# Patient Record
Sex: FEMALE | Race: BLACK OR AFRICAN AMERICAN | NOT HISPANIC OR LATINO
[De-identification: names, ages, dates, MRNs, and addresses within clinical notes are randomized per-mention and may not be internally consistent; named-entity substitution may affect disease eponyms.]

---

## 2017-01-24 ENCOUNTER — RESULT CHARGE (OUTPATIENT)
Age: 52
End: 2017-01-24

## 2017-01-25 ENCOUNTER — APPOINTMENT (OUTPATIENT)
Dept: OTHER | Facility: CLINIC | Age: 52
End: 2017-01-25

## 2017-01-25 VITALS
WEIGHT: 141 LBS | OXYGEN SATURATION: 100 % | DIASTOLIC BLOOD PRESSURE: 81 MMHG | HEIGHT: 64 IN | BODY MASS INDEX: 24.07 KG/M2 | HEART RATE: 70 BPM | SYSTOLIC BLOOD PRESSURE: 138 MMHG

## 2017-01-26 LAB
ALBUMIN SERPL ELPH-MCNC: 4.3 G/DL
ALP BLD-CCNC: 73 U/L
ALT SERPL-CCNC: 25 U/L
ANION GAP SERPL CALC-SCNC: 14 MMOL/L
APPEARANCE: CLEAR
AST SERPL-CCNC: 33 U/L
BASOPHILS # BLD AUTO: 0.02 K/UL
BASOPHILS NFR BLD AUTO: 0.4 %
BILIRUB SERPL-MCNC: 0.5 MG/DL
BILIRUBIN URINE: NEGATIVE
BLOOD URINE: NEGATIVE
BUN SERPL-MCNC: 17 MG/DL
CALCIUM SERPL-MCNC: 10.5 MG/DL
CHLORIDE SERPL-SCNC: 104 MMOL/L
CHOLEST SERPL-MCNC: 203 MG/DL
CHOLEST/HDLC SERPL: 1.8 RATIO
CO2 SERPL-SCNC: 28 MMOL/L
COLOR: YELLOW
CREAT SERPL-MCNC: 0.78 MG/DL
EOSINOPHIL # BLD AUTO: 0.07 K/UL
EOSINOPHIL NFR BLD AUTO: 1.5 %
GLUCOSE QUALITATIVE U: NORMAL MG/DL
GLUCOSE SERPL-MCNC: 91 MG/DL
HCT VFR BLD CALC: 37.4 %
HDLC SERPL-MCNC: 112 MG/DL
HGB BLD-MCNC: 11.6 G/DL
IMM GRANULOCYTES NFR BLD AUTO: 0 %
KETONES URINE: NEGATIVE
LDLC SERPL CALC-MCNC: 80 MG/DL
LEUKOCYTE ESTERASE URINE: ABNORMAL
LYMPHOCYTES # BLD AUTO: 1.6 K/UL
LYMPHOCYTES NFR BLD AUTO: 33.3 %
MAN DIFF?: NORMAL
MCHC RBC-ENTMCNC: 23.4 PG
MCHC RBC-ENTMCNC: 31 GM/DL
MCV RBC AUTO: 75.6 FL
MONOCYTES # BLD AUTO: 0.25 K/UL
MONOCYTES NFR BLD AUTO: 5.2 %
NEUTROPHILS # BLD AUTO: 2.87 K/UL
NEUTROPHILS NFR BLD AUTO: 59.6 %
NITRITE URINE: NEGATIVE
PH URINE: 7
PLATELET # BLD AUTO: 235 K/UL
POTASSIUM SERPL-SCNC: 4.5 MMOL/L
PROT SERPL-MCNC: 7.1 G/DL
PROTEIN URINE: NEGATIVE MG/DL
RBC # BLD: 4.95 M/UL
RBC # FLD: 14.6 %
SODIUM SERPL-SCNC: 146 MMOL/L
SPECIFIC GRAVITY URINE: 1.02
TRIGL SERPL-MCNC: 55 MG/DL
UROBILINOGEN URINE: NORMAL MG/DL
WBC # FLD AUTO: 4.81 K/UL

## 2017-02-28 ENCOUNTER — OUTPATIENT (OUTPATIENT)
Dept: OUTPATIENT SERVICES | Facility: HOSPITAL | Age: 52
LOS: 1 days | Discharge: HOME | End: 2017-02-28
Payer: COMMERCIAL

## 2017-02-28 ENCOUNTER — RESULT REVIEW (OUTPATIENT)
Age: 52
End: 2017-02-28

## 2017-02-28 ENCOUNTER — OTHER (OUTPATIENT)
Age: 52
End: 2017-02-28

## 2017-02-28 DIAGNOSIS — D05.10 INTRADUCTAL CARCINOMA IN SITU OF UNSPECIFIED BREAST: ICD-10-CM

## 2017-03-01 ENCOUNTER — APPOINTMENT (OUTPATIENT)
Dept: HEMATOLOGY ONCOLOGY | Facility: CLINIC | Age: 52
End: 2017-03-01

## 2017-03-01 VITALS
WEIGHT: 142.2 LBS | OXYGEN SATURATION: 100 % | SYSTOLIC BLOOD PRESSURE: 123 MMHG | DIASTOLIC BLOOD PRESSURE: 78 MMHG | BODY MASS INDEX: 24.28 KG/M2 | HEIGHT: 63.98 IN | TEMPERATURE: 98.4 F | RESPIRATION RATE: 16 BRPM | HEART RATE: 65 BPM

## 2017-03-01 DIAGNOSIS — C50.911 MALIGNANT NEOPLASM OF UNSPECIFIED SITE OF RIGHT FEMALE BREAST: ICD-10-CM

## 2017-03-01 DIAGNOSIS — Z86.69 PERSONAL HISTORY OF OTHER DISEASES OF THE NERVOUS SYSTEM AND SENSE ORGANS: ICD-10-CM

## 2017-03-01 LAB
BASOPHILS # BLD AUTO: 0 K/UL — SIGNIFICANT CHANGE UP (ref 0–0.2)
BASOPHILS NFR BLD AUTO: 0.6 % — SIGNIFICANT CHANGE UP (ref 0–2)
EOSINOPHIL # BLD AUTO: 0.2 K/UL — SIGNIFICANT CHANGE UP (ref 0–0.5)
EOSINOPHIL NFR BLD AUTO: 5.1 % — SIGNIFICANT CHANGE UP (ref 0–6)
HCT VFR BLD CALC: 35.7 % — SIGNIFICANT CHANGE UP (ref 34.5–45)
HGB BLD-MCNC: 11.5 G/DL — SIGNIFICANT CHANGE UP (ref 11.5–15.5)
LYMPHOCYTES # BLD AUTO: 1.6 K/UL — SIGNIFICANT CHANGE UP (ref 1–3.3)
LYMPHOCYTES # BLD AUTO: 33.4 % — SIGNIFICANT CHANGE UP (ref 13–44)
MCHC RBC-ENTMCNC: 24.8 PG — LOW (ref 27–34)
MCHC RBC-ENTMCNC: 32.4 G/DL — SIGNIFICANT CHANGE UP (ref 32–36)
MCV RBC AUTO: 76.8 FL — LOW (ref 80–100)
MONOCYTES # BLD AUTO: 0.6 K/UL — SIGNIFICANT CHANGE UP (ref 0–0.9)
MONOCYTES NFR BLD AUTO: 11.7 % — SIGNIFICANT CHANGE UP (ref 2–14)
NEUTROPHILS # BLD AUTO: 2.4 K/UL — SIGNIFICANT CHANGE UP (ref 1.8–7.4)
NEUTROPHILS NFR BLD AUTO: 49.3 % — SIGNIFICANT CHANGE UP (ref 43–77)
PLATELET # BLD AUTO: 175 K/UL — SIGNIFICANT CHANGE UP (ref 150–400)
RBC # BLD: 4.65 M/UL — SIGNIFICANT CHANGE UP (ref 3.8–5.2)
RBC # FLD: 12.9 % — SIGNIFICANT CHANGE UP (ref 10.3–14.5)
WBC # BLD: 4.9 K/UL — SIGNIFICANT CHANGE UP (ref 3.8–10.5)
WBC # FLD AUTO: 4.9 K/UL — SIGNIFICANT CHANGE UP (ref 3.8–10.5)

## 2017-03-01 RX ORDER — LYSINE HCL 500 MG
TABLET ORAL
Refills: 0 | Status: ACTIVE | COMMUNITY
Start: 2017-03-01

## 2017-03-01 RX ORDER — MULTIVITAMIN
TABLET ORAL
Refills: 0 | Status: ACTIVE | COMMUNITY
Start: 2017-03-01

## 2017-03-06 ENCOUNTER — RESULT REVIEW (OUTPATIENT)
Age: 52
End: 2017-03-06

## 2017-03-07 PROCEDURE — 88377 M/PHMTRC ALYS ISHQUANT/SEMIQ: CPT | Mod: 26,59

## 2017-03-07 PROCEDURE — 88321 CONSLTJ&REPRT SLD PREP ELSWR: CPT

## 2017-03-27 ENCOUNTER — OUTPATIENT (OUTPATIENT)
Dept: OUTPATIENT SERVICES | Facility: HOSPITAL | Age: 52
LOS: 1 days | Discharge: HOME | End: 2017-03-27

## 2017-03-27 DIAGNOSIS — C50.911 MALIGNANT NEOPLASM OF UNSPECIFIED SITE OF RIGHT FEMALE BREAST: ICD-10-CM

## 2017-03-28 ENCOUNTER — APPOINTMENT (OUTPATIENT)
Dept: HEMATOLOGY ONCOLOGY | Facility: CLINIC | Age: 52
End: 2017-03-28

## 2017-04-03 ENCOUNTER — APPOINTMENT (OUTPATIENT)
Dept: OTHER | Facility: CLINIC | Age: 52
End: 2017-04-03

## 2017-04-03 VITALS
HEART RATE: 64 BPM | WEIGHT: 142 LBS | SYSTOLIC BLOOD PRESSURE: 116 MMHG | BODY MASS INDEX: 24.24 KG/M2 | DIASTOLIC BLOOD PRESSURE: 71 MMHG | HEIGHT: 64 IN | OXYGEN SATURATION: 100 %

## 2017-04-05 ENCOUNTER — OTHER (OUTPATIENT)
Age: 52
End: 2017-04-05

## 2017-04-10 ENCOUNTER — APPOINTMENT (OUTPATIENT)
Dept: SURGICAL ONCOLOGY | Facility: CLINIC | Age: 52
End: 2017-04-10

## 2019-01-11 ENCOUNTER — OTHER (OUTPATIENT)
Age: 54
End: 2019-01-11

## 2019-01-18 ENCOUNTER — APPOINTMENT (OUTPATIENT)
Dept: OTHER | Facility: CLINIC | Age: 54
End: 2019-01-18
Payer: COMMERCIAL

## 2019-01-18 VITALS
RESPIRATION RATE: 16 BRPM | BODY MASS INDEX: 21.68 KG/M2 | HEIGHT: 64 IN | HEART RATE: 72 BPM | DIASTOLIC BLOOD PRESSURE: 70 MMHG | WEIGHT: 127 LBS | SYSTOLIC BLOOD PRESSURE: 110 MMHG | OXYGEN SATURATION: 100 %

## 2019-01-18 PROCEDURE — 99396 PREV VISIT EST AGE 40-64: CPT | Mod: 25

## 2019-01-18 PROCEDURE — 94010 BREATHING CAPACITY TEST: CPT

## 2019-01-18 PROCEDURE — 96150: CPT

## 2019-01-18 PROCEDURE — 99213 OFFICE O/P EST LOW 20 MIN: CPT | Mod: 25

## 2019-01-18 NOTE — DISCUSSION/SUMMARY
[Patient seen for WTC Monitoring ___] : Patient was seen for WTC monitoring [unfilled] [Please See Note in Chart and Documentation in Trial DB] : Please see note in chart and documentation in Trial DB. [FreeTextEntry3] : see OC MEd note \par CXR referral \par Spirometry NL improved

## 2019-01-18 NOTE — HISTORY OF PRESENT ILLNESS
[FreeTextEntry1] : ELIN CARTER is a 53 year old female withdiagnosis of breast cancer. Her oncologic history is as follows:\par \par \par She  had a right breast stereotactic core biopsy on 1/26/17 which revealed moderately differentiated invasive ductal carcinoma. Receptor status unknown. \par \par  [facility]\par She [facility] \par but wants her reconstructive surgery to be at [facility] \par \par Medical history is notable for a hysterectomy in 2015 for fibroids. WTC 9/11 exposure to dust and debris . Works as a . Had carpal tunnel surgery in 12/2016. Not on any medications aside from vitamins. 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 \par retired [employer] \par  she  stated that he VIT D level is ' high" and she [facility] \par she has "parathyroid issue ?"\par  she wants evaluation for that at [facility] as well \par She will bring me her [facility] to clarify this \par she stated that she colonoscopy last year

## 2019-01-18 NOTE — ASSESSMENT
[FreeTextEntry1] : will refer pt for reconstructive surgery at [facility] \par \par  pt will being med records re parathyroid problems\par \par

## 2019-01-18 NOTE — PHYSICAL EXAM
[General Appearance - Alert] : alert [General Appearance - In No Acute Distress] : in no acute distress [General Appearance - Well Nourished] : well nourished [General Appearance - Well Developed] : well developed [Moist] : moist mucosa [Dry] : mucosa that was not dry [Erythema] : erythema [Exudate] : no exudate [Cobblestoning] : no cobblestoning [Tonsils] : no abnormality seen [Auscultation Breath Sounds / Voice Sounds] : lungs were clear to auscultation bilaterally [Heart Rate And Rhythm] : heart rate was normal and rhythm regular [Heart Sounds] : normal S1 and S2 [Heart Sounds Gallop] : no gallops [Murmurs] : no murmurs [Heart Sounds Pericardial Friction Rub] : no pericardial rub [Bowel Sounds] : normal bowel sounds [Abdomen Soft] : soft [Abdomen Tenderness] : non-tender [] : no hepato-splenomegaly [Abdomen Mass (___ Cm)] : no abdominal mass palpated

## 2019-01-18 NOTE — HEALTH RISK ASSESSMENT
[Patient reported colonoscopy was normal] : Patient reported colonoscopy was normal [ColonoscopyDate] : 01/01/2018

## 2019-01-18 NOTE — PAST MEDICAL HISTORY
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

## 2019-01-18 NOTE — PAST MEDICAL HISTORY
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

## 2019-01-18 NOTE — ASSESSMENT
[FreeTextEntry1] : will refer pt for reconstructive surgery at [facility] \par \par  pt will being med records re parathyroid problems\par \par

## 2019-01-18 NOTE — HISTORY OF PRESENT ILLNESS
[FreeTextEntry1] : ELIN CARTER is a 53 year old female withdiagnosis of breast cancer. Her oncologic history is as follows:\par \par \par She  had a right breast stereotactic core biopsy on 1/26/17 which revealed moderately differentiated invasive ductal carcinoma. Receptor status unknown. \par \par  [facility]\par She has [facility]  sur[facility]cologist [facility] \par but wants her reconstructive surgery to be at [facility] \par \par Medical history is notable for a hysterectomy in 2015 for fibroids. WTC 9/11 exposure to dust and debris . Works as a . Had carpal tunnel surgery in 12/2016. Not on any medications aside from vitamins. 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 \par retired [employer] \par  she  stated that he VIT D level is ' high" and she is [facility] \par she has "parathyroid issue ?"\par  she wants evaluation for that at [facility] as well \par She will bring me her [facility] records from [facility] to clarify this \par she stated that she colonoscopy last year

## 2019-01-22 LAB
ALBUMIN SERPL ELPH-MCNC: 4.2 G/DL
ALP BLD-CCNC: 66 U/L
ALT SERPL-CCNC: 22 U/L
ANION GAP SERPL CALC-SCNC: 11 MMOL/L
APPEARANCE: ABNORMAL
AST SERPL-CCNC: 24 U/L
BACTERIA: NEGATIVE
BASOPHILS # BLD AUTO: 0.01 K/UL
BASOPHILS NFR BLD AUTO: 0.3 %
BILIRUB SERPL-MCNC: 0.4 MG/DL
BILIRUBIN URINE: NEGATIVE
BLOOD URINE: ABNORMAL
BUN SERPL-MCNC: 21 MG/DL
CALCIUM OXALATE CRYSTALS: ABNORMAL
CALCIUM SERPL-MCNC: 10.8 MG/DL
CHLORIDE SERPL-SCNC: 104 MMOL/L
CHOLEST SERPL-MCNC: 183 MG/DL
CHOLEST/HDLC SERPL: 1.9 RATIO
CO2 SERPL-SCNC: 29 MMOL/L
COLOR: YELLOW
CREAT SERPL-MCNC: 0.82 MG/DL
EOSINOPHIL # BLD AUTO: 0.05 K/UL
EOSINOPHIL NFR BLD AUTO: 1.6 %
GLUCOSE QUALITATIVE U: NEGATIVE MG/DL
GLUCOSE SERPL-MCNC: 65 MG/DL
HCT VFR BLD CALC: 37.1 %
HDLC SERPL-MCNC: 98 MG/DL
HGB BLD-MCNC: 11.6 G/DL
HYALINE CASTS: 0 /LPF
IMM GRANULOCYTES NFR BLD AUTO: 0 %
KETONES URINE: NEGATIVE
LDLC SERPL CALC-MCNC: 68 MG/DL
LEUKOCYTE ESTERASE URINE: ABNORMAL
LYMPHOCYTES # BLD AUTO: 0.91 K/UL
LYMPHOCYTES NFR BLD AUTO: 28.8 %
MAN DIFF?: NORMAL
MCHC RBC-ENTMCNC: 23.9 PG
MCHC RBC-ENTMCNC: 31.3 GM/DL
MCV RBC AUTO: 76.5 FL
MICROSCOPIC-UA: NORMAL
MONOCYTES # BLD AUTO: 0.22 K/UL
MONOCYTES NFR BLD AUTO: 7 %
NEUTROPHILS # BLD AUTO: 1.97 K/UL
NEUTROPHILS NFR BLD AUTO: 62.3 %
NITRITE URINE: NEGATIVE
PH URINE: 6
PLATELET # BLD AUTO: 218 K/UL
POTASSIUM SERPL-SCNC: 4 MMOL/L
PROT SERPL-MCNC: 6.8 G/DL
PROTEIN URINE: NEGATIVE MG/DL
RBC # BLD: 4.85 M/UL
RBC # FLD: 14.9 %
RED BLOOD CELLS URINE: 3 /HPF
SODIUM SERPL-SCNC: 144 MMOL/L
SPECIFIC GRAVITY URINE: 1.02
SQUAMOUS EPITHELIAL CELLS: 1 /HPF
TRIGL SERPL-MCNC: 87 MG/DL
UROBILINOGEN URINE: NEGATIVE MG/DL
WBC # FLD AUTO: 3.16 K/UL
WHITE BLOOD CELLS URINE: 2 /HPF

## 2019-01-23 ENCOUNTER — OTHER (OUTPATIENT)
Age: 54
End: 2019-01-23

## 2019-02-11 ENCOUNTER — FORM ENCOUNTER (OUTPATIENT)
Age: 54
End: 2019-02-11

## 2019-02-25 ENCOUNTER — APPOINTMENT (OUTPATIENT)
Dept: PLASTIC SURGERY | Facility: CLINIC | Age: 54
End: 2019-02-25

## 2019-02-28 ENCOUNTER — OTHER (OUTPATIENT)
Age: 54
End: 2019-02-28

## 2020-06-16 ENCOUNTER — APPOINTMENT (OUTPATIENT)
Dept: OTHER | Facility: CLINIC | Age: 55
End: 2020-06-16
Payer: COMMERCIAL

## 2020-06-16 PROCEDURE — 99396 PREV VISIT EST AGE 40-64: CPT | Mod: 95

## 2020-06-16 PROCEDURE — 99442: CPT | Mod: 95

## 2020-06-16 NOTE — ASSESSMENT
[FreeTextEntry1] : s/p R  mastectomy , [facility] and [facility] and Chemo in 2017\par \par invasive ductal carcinoma of r breast \par \par She [facility]cologist [facility] \par  she last was followed last year \par  i recommend she schedule a follow up  appointment with both \par she did not have  mammogram this year and requested referral \par \par i advised her to stop iron and follow up with me in 3 months to re check CBC\par  she never had anemia on CBC since 2015 \par  she has NL Hb with microcysts with NL/ near NL RDW and possibly has thalassemia trait \par \par i advised to start Miralax daily too \par

## 2020-06-16 NOTE — PAST MEDICAL HISTORY
[FreeTextEntry1] : 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

## 2020-06-16 NOTE — HISTORY OF PRESENT ILLNESS
[FreeTextEntry1] : ELIN CARTER is a 54  year old female with diagnosis of breast cancer. Her oncologic history is as follows:\par \par \par She had a right breast stereotactic core biopsy on 1/26/17 which revealed moderately differentiated invasive ductal carcinoma. Receptor status unknown. \par \par  [facility] , [facility] and [facility] and Chemo\par She [facility]cologist [facility] \par but wants her reconstructive surgery to be at [facility] but she deferred last year \par c/o constipation , on iron \par no longer mensurating \par \par \par \par Medical history is notable for a hysterectomy in 2015 for fibroids. WTC 9/11 exposure to dust and debris . Works as a . Had carpal tunnel surgery in 12/2016. Not on any medications aside from vitamins. 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 \par retired [employer] \par \par \par she stated that she colonoscopy 2 years ago \par

## 2020-06-18 NOTE — DISCUSSION/SUMMARY
[Home] : at home, [unfilled] , at the time of the visit. [Other Location: e.g. Home (Enter Location, City,State)___] : at [unfilled] [Verbal consent obtained from patient] : the patient, [unfilled] [Patient seen for WTC Monitoring ___] : Patient was seen for WTC monitoring [unfilled] [Please See Note in Chart and Documentation in Trial DB] : Please see note in chart and documentation in Trial DB. [FreeTextEntry3] : see follow up OC MEd note \par

## 2020-07-07 ENCOUNTER — OUTPATIENT (OUTPATIENT)
Dept: OUTPATIENT SERVICES | Facility: HOSPITAL | Age: 55
LOS: 1 days | End: 2020-07-07

## 2020-07-07 DIAGNOSIS — C50.911 MALIGNANT NEOPLASM OF UNSPECIFIED SITE OF RIGHT FEMALE BREAST: ICD-10-CM

## 2020-08-01 ENCOUNTER — RESULT REVIEW (OUTPATIENT)
Age: 55
End: 2020-08-01

## 2020-08-01 ENCOUNTER — APPOINTMENT (OUTPATIENT)
Dept: MAMMOGRAPHY | Facility: IMAGING CENTER | Age: 55
End: 2020-08-01
Payer: COMMERCIAL

## 2020-08-01 ENCOUNTER — OUTPATIENT (OUTPATIENT)
Dept: OUTPATIENT SERVICES | Facility: HOSPITAL | Age: 55
LOS: 1 days | End: 2020-08-01
Payer: COMMERCIAL

## 2020-08-01 DIAGNOSIS — C50.911 MALIGNANT NEOPLASM OF UNSPECIFIED SITE OF RIGHT FEMALE BREAST: ICD-10-CM

## 2020-08-01 PROCEDURE — G0279: CPT | Mod: 26

## 2020-08-01 PROCEDURE — G0279: CPT

## 2020-08-01 PROCEDURE — 77065 DX MAMMO INCL CAD UNI: CPT

## 2020-08-01 PROCEDURE — 77065 DX MAMMO INCL CAD UNI: CPT | Mod: 26,LT

## 2021-08-26 ENCOUNTER — APPOINTMENT (OUTPATIENT)
Dept: OTHER | Facility: CLINIC | Age: 56
End: 2021-08-26

## 2021-10-28 ENCOUNTER — APPOINTMENT (OUTPATIENT)
Dept: OTHER | Facility: CLINIC | Age: 56
End: 2021-10-28

## 2022-03-23 ENCOUNTER — FORM ENCOUNTER (OUTPATIENT)
Age: 57
End: 2022-03-23

## 2022-03-23 ENCOUNTER — APPOINTMENT (OUTPATIENT)
Dept: OTHER | Facility: CLINIC | Age: 57
End: 2022-03-23
Payer: COMMERCIAL

## 2022-03-23 DIAGNOSIS — Z04.9 ENCOUNTER FOR EXAMINATION AND OBSERVATION FOR UNSPECIFIED REASON: ICD-10-CM

## 2022-03-23 DIAGNOSIS — C50.911 MALIGNANT NEOPLASM OF UNSPECIFIED SITE OF RIGHT FEMALE BREAST: ICD-10-CM

## 2022-03-23 PROCEDURE — 99442: CPT | Mod: 95

## 2022-03-23 PROCEDURE — 99396 PREV VISIT EST AGE 40-64: CPT | Mod: 95

## 2022-03-24 ENCOUNTER — FORM ENCOUNTER (OUTPATIENT)
Age: 57
End: 2022-03-24

## 2022-03-24 NOTE — REASON FOR VISIT
[Follow-Up] : a follow-up visit [FreeTextEntry1] : [facility]

## 2022-03-24 NOTE — REASON FOR VISIT
[Follow-Up] : a follow-up visit [FreeTextEntry1] : [facility]

## 2022-03-24 NOTE — HISTORY OF PRESENT ILLNESS
[Home] : at home, [unfilled] , at the time of the visit. [Verbal consent obtained from patient] : the patient, [unfilled] [FreeTextEntry1] : ELIN CARTER is a 56  year old female with diagnosis of breast cancer. Her oncologic history is as follows:\par \par \par She had a right breast stereotactic core biopsy on 1/26/17 which revealed moderately differentiated invasive ductal carcinoma. \par \par  [facility] and then underwent [facility] and Chemo\par She [facility]cologist at [facility] \par she wants her reconstructive surgery to be at [facility] but she deferred in the past \par \par \par \par \par Medical history is notable for a hysterectomy in 2015 for fibroids. patient is a  that was exposed to [facility] dust and debris after 09 11 2001\par \par former [facility] . Had carpal tunnel surgery in 12/2016. Not on any medications aside from vitamins. 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 \par she is also a retired  army  \par \par \par she stated that she colonoscopy 3  years ago \par

## 2022-03-24 NOTE — ASSESSMENT
[FreeTextEntry1] : [facility] and [facility] and Chemo in 2017\par \par invasive ductal carcinoma of right  breast \par \par She has [facility] and Oncologist [facility] \par  she stated that she  last was followed last year \par refer to Plastic surgeon for breast restoration at [facility] \par she  stated that she had   [facility]ammogram [facility] and  and has a referral for 2022 mammogram \par \par Neuropathy - chemo therapy induced \par continue   Neurontin 300 mg HS\par refer to Neurologist as pt wants to exposure other treatment options fro Neuropathy \par \par \par \par \par \par

## 2022-03-24 NOTE — DISCUSSION/SUMMARY
[Patient seen for WTC Monitoring ___] : Patient was seen for WTC monitoring [unfilled] [Please See Note in Chart and Documentation in Trial DB] : Please see note in chart and documentation in Trial DB. [Home] : at home, [unfilled] , at the time of the visit. [Other Location: e.g. Home (Enter Location, City,State)___] : at [unfilled] [Verbal consent obtained from patient] : the patient, [unfilled] [FreeTextEntry3] : patient is a  that was exposed to [facility] dust and debris after 09 11 2001\par \par \par see follow up OC MEd note \par

## 2022-03-24 NOTE — ASSESSMENT
[FreeTextEntry1] : [facility] and [facility] and Chemo in 2017\par \par invasive ductal carcinoma of right  breast \par \par She has Breast surgeon and Oncologist at [facility] \par  she stated that she  last was followed last year \par refer to Plastic surgeon for breast restoration at [facility] \par she  stated that she had   mammogram [facility] and  and has a referral for 2022 mammogram \par \par Neuropathy - chemo therapy induced \par continue   Neurontin 300 mg HS\par refer to Neurologist as pt wants to exposure other treatment options fro Neuropathy \par \par \par \par \par \par

## 2022-03-24 NOTE — PAST MEDICAL HISTORY
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

## 2022-03-24 NOTE — DISCUSSION/SUMMARY
[Patient seen for WTC Monitoring ___] : Patient was seen for WTC monitoring [unfilled] [Please See Note in Chart and Documentation in Trial DB] : Please see note in chart and documentation in Trial DB. [Home] : at home, [unfilled] , at the time of the visit. [Other Location: e.g. Home (Enter Location, City,State)___] : at [unfilled] [Verbal consent obtained from patient] : the patient, [unfilled] [FreeTextEntry3] : patient is a  that was exposed to [facility] dust and debris after 09 11 2001\par \par \par see follow up OC MEd note \par

## 2022-03-24 NOTE — HISTORY OF PRESENT ILLNESS
[Home] : at home, [unfilled] , at the time of the visit. [Verbal consent obtained from patient] : the patient, [unfilled] [FreeTextEntry1] : ELIN CARTER is a 56  year old female with diagnosis of breast cancer. Her oncologic history is as follows:\par \par \par She had a right breast stereotactic core biopsy on 1/26/17 which revealed moderately differentiated invasive ductal carcinoma. \par \par  [facility] and then underwent [facility] and Chemo\par She [facility]cologist [facility] \par she wants her reconstructive surgery to be at [facility] but she deferred in the past \par \par \par \par \par Medical history is notable for a hysterectomy in 2015 for fibroids. patient is a  that was exposed to [facility] dust and debris after 09 11 2001\par \par former [facility] . Had carpal tunnel surgery in 12/2016. Not on any medications aside from vitamins. 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 \par she is also a retired  army  \par \par \par she stated that she colonoscopy 3  years ago \par

## 2022-03-24 NOTE — PAST MEDICAL HISTORY
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

## 2022-03-25 ENCOUNTER — NON-APPOINTMENT (OUTPATIENT)
Age: 57
End: 2022-03-25

## 2022-04-08 RX ORDER — GABAPENTIN 300 MG/1
300 CAPSULE ORAL
Qty: 30 | Refills: 4 | Status: ACTIVE | COMMUNITY
Start: 2022-03-23 | End: 1900-01-01

## 2022-04-11 ENCOUNTER — APPOINTMENT (OUTPATIENT)
Dept: NEUROLOGY | Facility: AMBULATORY SURGERY CENTER | Age: 57
End: 2022-04-11
Payer: COMMERCIAL

## 2022-04-11 VITALS
DIASTOLIC BLOOD PRESSURE: 85 MMHG | BODY MASS INDEX: 20.83 KG/M2 | SYSTOLIC BLOOD PRESSURE: 124 MMHG | HEART RATE: 74 BPM | OXYGEN SATURATION: 100 % | WEIGHT: 122 LBS | HEIGHT: 64 IN | TEMPERATURE: 97.3 F

## 2022-04-11 PROCEDURE — 99204 OFFICE O/P NEW MOD 45 MIN: CPT

## 2022-04-11 RX ORDER — ANASTROZOLE TABLETS 1 MG/1
1 TABLET ORAL DAILY
Qty: 30 | Refills: 0 | Status: ACTIVE | COMMUNITY
Start: 2022-04-11

## 2022-04-11 NOTE — PHYSICAL EXAM
[FreeTextEntry1] : General: this is a pleasant patient in no acute distress\par \par HEENT conjunctiva are normal, no tenderness in head\par \par CV: normal pulses, regular rate and rhythm, no peripheral edema noted\par \par Lungs: breathing is non-labored\par \par abd: soft and non-distended\par \par MSK:\par SLR: \par LISBET:\par rang of motion:\par tinnels: \par spurling:\par Occipital nerve tenderness:\par \par Mental status:\par Alert and oriented to person, place and time\par Language is normal \par \par Cranial Nerves:\par extra-occular movements in tact without nystagmus, normal saccades and smooth pursuit, visual fields full to confrontation, pupils equal, round and reactive to light. Face symmetric and facial strength symmetric, facial sensation symmetric, hearing present bilaterally to finger rub,  neck flexion and extension normal strength.\par \par Motor: normal bulk and tone throughout. no abnormal movements.  Full 5/5 strength uppers and lower extremities proximally and distally except both hand intrinsics 4-/5 but unclear how much is pain limited.  atrophy in L APB\par \par Sensory: in tact and symmetric to vibration, light tough, pin prick, temperature except decreased PP, vib, temp all fingers.  L hand +++ allodynia\par \par Cerebellar: normal finger-nose-finger bilaterally\par \par Reflexes: 1+ in the uppers, 2+ knees, 1+ ankles and symmetric.  toes are bilaterally downgoing.\par \par Gait: stable, able to tip toe heel and some difficulty with tandem\par \par Stances:\par Romberg: stable\par \par

## 2022-04-11 NOTE — HISTORY OF PRESENT ILLNESS
[FreeTextEntry1] : ELIN CARTER is a 56 year who presents with neuropathy.\par \par Diagnosed with breast Ca in 2016.   Had unilateral mastectomy in 2017. Had adjuvant chemo and then radiation between 2051-5750.  Still on hormonal pill to prevent recurrence.  \par Neuropathic symptoms started quickly after chemo started.  Worse in the fingers than the toes.  \par Has tried gabapentin 100 then increased to 300mg.  Takes this every night and usually twice a day.  800mg ibuprofen also helps.  Gabapentin still makes her tired with each dose.\par Also had thyroid mass develop during chemo but this is thought to be benign. thyroid function has been okay.\par She has been anemic and needed iron infusions.\par might have mild benefit from diclofenac gel\par Occupation is  and just retired.  \par \par Denies diplopia, blurred vision, dysphagia, dysarthria, aphasia, focal weakness or numbness, bowel or bladder dysfunction, imbalance, falls, headaches.\par

## 2022-04-12 LAB
ESTIMATED AVERAGE GLUCOSE: 114 MG/DL
HBA1C MFR BLD HPLC: 5.6 %
T4 FREE SERPL-MCNC: 1 NG/DL
TSH SERPL-ACNC: 1.96 UIU/ML
VIT B12 SERPL-MCNC: 941 PG/ML

## 2022-04-13 LAB — M PROTEIN SPEC IFE-MCNC: NORMAL

## 2022-04-18 ENCOUNTER — TRANSCRIPTION ENCOUNTER (OUTPATIENT)
Age: 57
End: 2022-04-18

## 2022-04-19 ENCOUNTER — NON-APPOINTMENT (OUTPATIENT)
Age: 57
End: 2022-04-19

## 2022-04-25 LAB
PARANEOPLASTIC AB PNL SER: NORMAL
VIT B6 SERPL-MCNC: 15.7 UG/L

## 2022-04-26 LAB — VIT B1 SERPL-MCNC: 60.4 NMOL/L

## 2022-05-31 ENCOUNTER — APPOINTMENT (OUTPATIENT)
Dept: PLASTIC SURGERY | Facility: CLINIC | Age: 57
End: 2022-05-31
Payer: COMMERCIAL

## 2022-05-31 VITALS — BODY MASS INDEX: 22.53 KG/M2 | OXYGEN SATURATION: 100 % | HEIGHT: 64 IN | HEART RATE: 64 BPM | WEIGHT: 132 LBS

## 2022-05-31 DIAGNOSIS — Z42.1 ENCOUNTER FOR BREAST RECONSTRUCTION FOLLOWING MASTECTOMY: ICD-10-CM

## 2022-05-31 PROCEDURE — 99203 OFFICE O/P NEW LOW 30 MIN: CPT

## 2022-05-31 RX ORDER — AMITRIPTYLINE HYDROCHLORIDE 10 MG/1
10 TABLET, FILM COATED ORAL
Qty: 60 | Refills: 2 | Status: ACTIVE | COMMUNITY
Start: 2022-04-11 | End: 1900-01-01

## 2022-05-31 NOTE — PHYSICAL EXAM
[de-identified] : Absence of right breast - flat transverse mastectomy scar. Left breast grade I ptosis. Right back Lattisimus region soft no scars normal Lat muscle. left chest mediport in place

## 2022-05-31 NOTE — HISTORY OF PRESENT ILLNESS
[FreeTextEntry1] : 57 y/o with history of right breast cancer referred by Dr. Mulligan presents for initial consultation for breast reconstruction. Patient had right breast mastectomy in 2017 with Dr. Mcgovern at [facility]. Patient underwent chemotherapy and radiation in [id number]. Patient is currently taking Anastrazole. No family history of breast cancer. Maternal aunt was diagnosed with ovarian cancer in her late 60s. Patient doesn't know if she had a genetic test done before.\par \par No personal history bleeding/clotting disorder. VTE Score; 5

## 2022-05-31 NOTE — ASSESSMENT
[FreeTextEntry1] : Marisol presents to discuss delayed breast reconstruction of her right mastectomy site. She has a history of radiation. She will need Lat Flap plus Tissue expander followed by implant exchange. \par \par I reviewed with her the risks and alternatives of this surgery.

## 2022-07-11 ENCOUNTER — APPOINTMENT (OUTPATIENT)
Dept: NEUROLOGY | Facility: AMBULATORY SURGERY CENTER | Age: 57
End: 2022-07-11

## 2022-07-11 VITALS
BODY MASS INDEX: 23.05 KG/M2 | TEMPERATURE: 97.8 F | OXYGEN SATURATION: 100 % | SYSTOLIC BLOOD PRESSURE: 113 MMHG | DIASTOLIC BLOOD PRESSURE: 74 MMHG | HEIGHT: 64 IN | HEART RATE: 80 BPM | WEIGHT: 135 LBS

## 2022-07-11 DIAGNOSIS — T45.1X5A DRUG-INDUCED POLYNEUROPATHY: ICD-10-CM

## 2022-07-11 DIAGNOSIS — G62.0 DRUG-INDUCED POLYNEUROPATHY: ICD-10-CM

## 2022-07-11 PROCEDURE — 99214 OFFICE O/P EST MOD 30 MIN: CPT

## 2022-07-11 RX ORDER — AMITRIPTYLINE HYDROCHLORIDE 25 MG/1
25 TABLET, FILM COATED ORAL
Qty: 60 | Refills: 2 | Status: ACTIVE | COMMUNITY
Start: 2022-07-11 | End: 1900-01-01

## 2022-07-11 NOTE — HISTORY OF PRESENT ILLNESS
[FreeTextEntry1] : ELIN CARTER is a 56 year who returns to follow up for \par \par last visit was 4/11/2022\par \par since last visit has found that amitriptyline 20mg QHS helps completely sleep through the night. has some ups and downs during the day.\par \par still taking gabapentin 300mg TID plus 300mg Daily PRN\par \par denies new dryness or weight gain.\par \par \par

## 2022-07-11 NOTE — PHYSICAL EXAM
[FreeTextEntry1] : General: this is a pleasant patient in no acute distress\par \par HEENT conjunctiva are normal, no tenderness in head\par \par CV: normal pulses, regular rate and rhythm, no peripheral edema noted\par \par Lungs: breathing is non-labored\par \par abd: soft and non-distended\par \par MSK:\par SLR: \par LISBET:\par rang of motion:\par tinnels: \par spurling:\par Occipital nerve tenderness:\par \par Mental status:\par Alert and oriented to person, place and time\par Language is normal \par \par Cranial Nerves:\par extra-occular movements in tact without nystagmus, normal saccades and smooth pursuit, visual fields full to confrontation, pupils equal, round and reactive to light. Face symmetric and facial strength symmetric, facial sensation symmetric, hearing present bilaterally to finger rub,  neck flexion and extension normal strength.\par \par Motor: normal bulk and tone throughout. no abnormal movements.  Full 5/5 strength uppers and lower extremities proximally and distally except both hand intrinsics 4+/5 now\par \par Sensory: in tact and symmetric to vibration, light tough, pin prick, temperature except decreased PP, vib, temp all fingers.  \par \par Cerebellar: normal finger-nose-finger bilaterally\par \par Reflexes: 1+ in the uppers, 2+ knees, 1+ ankles and symmetric.  toes are bilaterally downgoing.\par \par Gait: stable, able to tip toe heel and some difficulty with tandem\par \par Stances:\par Romberg: stable\par \par

## 2022-10-06 ENCOUNTER — APPOINTMENT (OUTPATIENT)
Age: 57
End: 2022-10-06

## 2023-05-02 ENCOUNTER — APPOINTMENT (OUTPATIENT)
Dept: OTHER | Facility: CLINIC | Age: 58
End: 2023-05-02

## 2023-07-06 ENCOUNTER — APPOINTMENT (OUTPATIENT)
Dept: OTHER | Facility: CLINIC | Age: 58
End: 2023-07-06